# Patient Record
Sex: FEMALE | Race: BLACK OR AFRICAN AMERICAN | NOT HISPANIC OR LATINO | Employment: PART TIME | URBAN - METROPOLITAN AREA
[De-identification: names, ages, dates, MRNs, and addresses within clinical notes are randomized per-mention and may not be internally consistent; named-entity substitution may affect disease eponyms.]

---

## 2020-03-12 PROBLEM — E11.9 TYPE 2 DIABETES MELLITUS WITHOUT COMPLICATION, WITHOUT LONG-TERM CURRENT USE OF INSULIN: Status: ACTIVE | Noted: 2019-08-08

## 2020-03-12 PROBLEM — K21.9 GASTROESOPHAGEAL REFLUX DISEASE: Status: ACTIVE | Noted: 2019-08-08

## 2020-03-12 PROBLEM — M25.50 ARTHRALGIA OF MULTIPLE JOINTS: Status: ACTIVE | Noted: 2019-08-08

## 2020-03-12 PROBLEM — F32.A DEPRESSION: Status: ACTIVE | Noted: 2019-08-08

## 2020-03-12 PROBLEM — R51.9 NONINTRACTABLE EPISODIC HEADACHE: Status: ACTIVE | Noted: 2017-03-19

## 2020-11-03 PROBLEM — K59.00 CONSTIPATION: Status: ACTIVE | Noted: 2020-11-03

## 2020-11-19 PROBLEM — Z12.11 SCREENING FOR MALIGNANT NEOPLASM OF COLON: Status: ACTIVE | Noted: 2020-11-19

## 2021-01-13 PROBLEM — Z71.89 PAIN MANAGEMENT CONTRACT DISCUSSED: Status: ACTIVE | Noted: 2021-01-13

## 2021-01-13 PROBLEM — Z78.0 POSTMENOPAUSAL: Status: ACTIVE | Noted: 2021-01-13

## 2021-01-13 PROBLEM — E78.5 HYPERLIPIDEMIA: Status: ACTIVE | Noted: 2021-01-13

## 2021-02-05 LAB
LEFT EYE DM RETINOPATHY: NEGATIVE
RIGHT EYE DM RETINOPATHY: NEGATIVE

## 2021-03-31 PROBLEM — J30.9 ALLERGIC RHINITIS: Status: ACTIVE | Noted: 2021-03-31

## 2021-05-12 ENCOUNTER — PATIENT MESSAGE (OUTPATIENT)
Dept: RESEARCH | Facility: HOSPITAL | Age: 51
End: 2021-05-12

## 2021-07-07 PROBLEM — E11.42 TYPE 2 DIABETES MELLITUS WITH DIABETIC POLYNEUROPATHY, WITHOUT LONG-TERM CURRENT USE OF INSULIN: Status: ACTIVE | Noted: 2019-08-08

## 2021-07-07 PROBLEM — B37.9 YEAST INFECTION: Status: ACTIVE | Noted: 2021-07-07

## 2021-07-29 ENCOUNTER — PATIENT OUTREACH (OUTPATIENT)
Dept: ADMINISTRATIVE | Facility: HOSPITAL | Age: 51
End: 2021-07-29

## 2021-12-14 ENCOUNTER — PATIENT OUTREACH (OUTPATIENT)
Dept: ADMINISTRATIVE | Facility: HOSPITAL | Age: 51
End: 2021-12-14

## 2022-07-24 DIAGNOSIS — U07.1 COVID-19 VIRUS DETECTED: ICD-10-CM

## 2022-07-25 ENCOUNTER — NURSE TRIAGE (OUTPATIENT)
Dept: ADMINISTRATIVE | Facility: CLINIC | Age: 52
End: 2022-07-25

## 2022-07-25 NOTE — TELEPHONE ENCOUNTER
[facility] call -     1st call had technical issues. Pt could not hear.    2nd attempt made and VM left for Pt to call ooc at 0  to speak with a nurse or if having a medical emergency to call 911 or go to the nearest ER. Another attempt to contact you on this line will be conducted if we do not hear from you first.    3rd and final attempt made with above message and this is the final attempt to reach you.    No contact protocol followed and pt advised to call ooc. Via VM. Encounter routed to PCP and staff.    Reason for Disposition   Message left on unidentified voice mail. Phone number verified.    Protocols used: NO CONTACT OR DUPLICATE CONTACT CALL-A-[place]

## 2022-08-04 ENCOUNTER — PATIENT OUTREACH (OUTPATIENT)
Dept: EMERGENCY MEDICINE | Facility: OTHER | Age: 52
End: 2022-08-04

## 2022-08-04 NOTE — PROGRESS NOTES
Spoke to patient today and she stated she was doing great. Patient has made a full recovery after having Covid and stated she has no additional needs at this time. Instructed pt to call with any future needs/concerns and she verbalized understanding.

## 2022-08-21 PROBLEM — E11.65 UNCONTROLLED TYPE 2 DIABETES MELLITUS WITH HYPERGLYCEMIA: Status: ACTIVE | Noted: 2022-08-21

## 2022-11-29 ENCOUNTER — PATIENT OUTREACH (OUTPATIENT)
Dept: ADMINISTRATIVE | Facility: HOSPITAL | Age: 52
End: 2022-11-29

## 2022-11-29 DIAGNOSIS — E11.42 TYPE 2 DIABETES MELLITUS WITH DIABETIC POLYNEUROPATHY, WITHOUT LONG-TERM CURRENT USE OF INSULIN: Primary | ICD-10-CM

## 2022-11-30 ENCOUNTER — PATIENT OUTREACH (OUTPATIENT)
Dept: EMERGENCY MEDICINE | Facility: OTHER | Age: 52
End: 2022-11-30

## 2022-11-30 NOTE — PROGRESS NOTES
Spoke to patient today and gave her an appointment reminder for appointment scheduled on 12/01/2022 with Dr. Montoya at 9:00 a.m. Pt verbalized understanding.

## 2022-11-30 NOTE — PROGRESS NOTES
Spoke to patient today and she stated she was doing well and had no additional needs at this time. Instructed pt to call with any future needs/concerns and she verbalized understanding.

## 2022-12-01 PROBLEM — G89.29 CHRONIC PAIN OF RIGHT KNEE: Status: ACTIVE | Noted: 2022-12-01

## 2022-12-01 PROBLEM — M25.561 CHRONIC PAIN OF RIGHT KNEE: Status: ACTIVE | Noted: 2022-12-01

## 2022-12-01 PROBLEM — Z02.89 PAIN MANAGEMENT CONTRACT SIGNED: Status: ACTIVE | Noted: 2021-01-13

## 2023-03-02 PROBLEM — B37.9 YEAST INFECTION: Status: RESOLVED | Noted: 2021-07-07 | Resolved: 2023-03-02

## 2023-06-22 ENCOUNTER — PATIENT MESSAGE (OUTPATIENT)
Dept: ADMINISTRATIVE | Facility: OTHER | Age: 53
End: 2023-06-22

## 2024-06-04 ENCOUNTER — PATIENT OUTREACH (OUTPATIENT)
Dept: ADMINISTRATIVE | Facility: OTHER | Age: 54
End: 2024-06-04

## 2024-06-04 NOTE — PROGRESS NOTES
CHW - Initial Contact    This Community Health Worker completed the Social Determinant of Health questionnaire with patient during clinic visit today.    Pt identified barriers of most importance are: patient identified no barriers of importance during clinic visit    Referrals to community agencies completed with patient consent outside of [facility] include: No community referral needed during clinic visit   Referrals were put through [facility] - no:   Support and Services: No support services needed during clinic visit  Other information discussed the patient needs help with: patient discussed no other information during clinic visit    Follow up required: Yes  Follow-up Outreach - Due: 6/18/2024

## 2024-07-29 ENCOUNTER — PATIENT OUTREACH (OUTPATIENT)
Dept: ADMINISTRATIVE | Facility: OTHER | Age: 54
End: 2024-07-29

## 2024-07-29 NOTE — PROGRESS NOTES
CHW - Follow Up    This Community Health Worker completed a follow up visit with patient via telephone today.  Pt reported: patient reported she is doing good no assistance is needed during follow up phone interview  Patient will reach out if assistance is needed in the future.   Community Health Worker provided: patient is dong good   Follow up required: No  No future outreach task assigned                     CHW - Case Closure    This Community Health Worker spoke to patient via telephone today.   Pt reported: patient reported she is doing good no assistance is needed during follow up phone interview  Patient will reach out if assistance is needed in the future.   Pt denied any additional needs at this time and agrees with episode closure at this time.    Provided patient with Community Health Worker's contact information and encouraged   her to contact this Community Health Worker if additional needs arise.

## 2025-06-09 ENCOUNTER — PATIENT OUTREACH (OUTPATIENT)
Dept: ADMINISTRATIVE | Facility: HOSPITAL | Age: 55
End: 2025-06-09

## 2025-06-09 DIAGNOSIS — E11.9 TYPE 2 DIABETES MELLITUS WITHOUT COMPLICATION, WITHOUT LONG-TERM CURRENT USE OF INSULIN: Primary | ICD-10-CM
